# Patient Record
Sex: FEMALE | Race: BLACK OR AFRICAN AMERICAN | NOT HISPANIC OR LATINO | URBAN - METROPOLITAN AREA
[De-identification: names, ages, dates, MRNs, and addresses within clinical notes are randomized per-mention and may not be internally consistent; named-entity substitution may affect disease eponyms.]

---

## 2023-10-01 PROBLEM — F41.0 PANIC DISORDER WITHOUT AGORAPHOBIA: Status: ACTIVE | Noted: 2023-10-01

## 2023-10-01 PROBLEM — I10 HYPERTENSION: Status: ACTIVE | Noted: 2023-10-01

## 2023-10-01 RX ORDER — SERTRALINE HYDROCHLORIDE 50 MG/1
1 TABLET, FILM COATED ORAL DAILY
COMMUNITY
Start: 2017-11-21 | End: 2023-10-17 | Stop reason: SDUPTHER

## 2023-10-01 RX ORDER — METOPROLOL SUCCINATE 50 MG/1
TABLET, EXTENDED RELEASE ORAL
COMMUNITY
Start: 2017-04-21

## 2023-10-01 RX ORDER — PHENTERMINE HYDROCHLORIDE 37.5 MG/1
37.5 TABLET ORAL DAILY
COMMUNITY
Start: 2022-10-24

## 2023-10-03 ENCOUNTER — APPOINTMENT (OUTPATIENT)
Dept: BEHAVIORAL HEALTH | Facility: CLINIC | Age: 58
End: 2023-10-03
Payer: COMMERCIAL

## 2023-10-17 ENCOUNTER — OFFICE VISIT (OUTPATIENT)
Dept: BEHAVIORAL HEALTH | Facility: CLINIC | Age: 58
End: 2023-10-17
Payer: COMMERCIAL

## 2023-10-17 DIAGNOSIS — F41.0 PANIC DISORDER WITHOUT AGORAPHOBIA: ICD-10-CM

## 2023-10-17 PROCEDURE — 99214 OFFICE O/P EST MOD 30 MIN: CPT | Performed by: PSYCHIATRY & NEUROLOGY

## 2023-10-17 RX ORDER — SERTRALINE HYDROCHLORIDE 50 MG/1
50 TABLET, FILM COATED ORAL DAILY
Qty: 90 TABLET | Refills: 3 | Status: SHIPPED | OUTPATIENT
Start: 2023-10-17 | End: 2024-10-16

## 2023-10-17 NOTE — PROGRESS NOTES
Patient Discussion/Summary     Doing very well, continue sertraline 50 mg daily and to maintain the counterphobic/acceptance attitude toward occasional anxious thoughts or feelings. Follow-up in 6 months, earlier when necessary.      Chief Complaint        Stable, chronic illness at goal.  30'      History of Present Illness  This note was dictated using dragon speak there may be errors in transcription of the dictation.  Duration 30'     Here on a scheduled regular follow-up visit for this 58-year-old black single woman who was seen for the first time in the anxiety clinic on 9/5/17 with history of complaints of anxiety and panic of 6 months duration with 3 emergency room visits and no prior psychiatric treatment history. She was diagnosed with panic disorder and started on alprazolam 0.25 mg 3 times daily, sertraline 50 mg daily and exposure-based CBT with very good response with no panic attacks and being gradually more and more adjusted to her new job. Her improvement has been maintained following gradual taper and discontinuation of alprazolam about 5 years ago with no more panic attacks or severe anxiousness and with expressed feelings of well-being and self-efficacy. She started and continues to socialize more with friends,, has traveled long distances to [place], [place] without difficulty. At her last visit on 5/12/22 she had continued to work,remotely and was satisfied with her job, she was happy about her daughter who continues to work successfully as a beautician in [place]. She had recovered from cholecystectomy in February and she appeared to be in no acute distress, agitation or confusion, alert, oriented, cooperative, pleasant and appropriate, no evidence of psychosis, major mood disturbance, suicidal or homicidal ideations with intent or plan. She denied alcohol or substance abuse.The plan Was to follow-up in 1 year.  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  She reported liking her job very much.  She also reported having dated for 8 months to a wonderful man who unexpectedly committed suicide.  She she had processed the situation very well without self blame of any kind as the relationship apparently was very good and she has only good memories of him.  She has been followed regularly by his primary care physician with last lab results reportedly within normal including thyroid function tests and a yearly check pending in January.  Impression panic disorder F 41.0 in remission.  Plan: Continue sertraline 50 mg daily (and metoprolol through PCP) until RTC in one year, earlier when necessary.

## 2024-10-12 DIAGNOSIS — F41.0 PANIC DISORDER WITHOUT AGORAPHOBIA: ICD-10-CM

## 2024-10-15 RX ORDER — SERTRALINE HYDROCHLORIDE 50 MG/1
50 TABLET, FILM COATED ORAL DAILY
Qty: 90 TABLET | Refills: 3 | Status: SHIPPED | OUTPATIENT
Start: 2024-10-15

## 2025-10-14 ENCOUNTER — APPOINTMENT (OUTPATIENT)
Dept: BEHAVIORAL HEALTH | Facility: CLINIC | Age: 60
End: 2025-10-14
Payer: COMMERCIAL